# Patient Record
Sex: MALE | Race: WHITE | NOT HISPANIC OR LATINO | Employment: PART TIME | URBAN - NONMETROPOLITAN AREA
[De-identification: names, ages, dates, MRNs, and addresses within clinical notes are randomized per-mention and may not be internally consistent; named-entity substitution may affect disease eponyms.]

---

## 2020-01-27 ENCOUNTER — APPOINTMENT (EMERGENCY)
Dept: RADIOLOGY | Facility: HOSPITAL | Age: 55
End: 2020-01-27
Payer: OTHER GOVERNMENT

## 2020-01-27 ENCOUNTER — HOSPITAL ENCOUNTER (EMERGENCY)
Facility: HOSPITAL | Age: 55
Discharge: HOME | End: 2020-01-27
Attending: EMERGENCY MEDICINE | Admitting: EMERGENCY MEDICINE
Payer: OTHER GOVERNMENT

## 2020-01-27 VITALS
OXYGEN SATURATION: 99 % | RESPIRATION RATE: 18 BRPM | HEIGHT: 68 IN | DIASTOLIC BLOOD PRESSURE: 85 MMHG | TEMPERATURE: 97.1 F | WEIGHT: 220 LBS | SYSTOLIC BLOOD PRESSURE: 133 MMHG | HEART RATE: 78 BPM | BODY MASS INDEX: 33.34 KG/M2

## 2020-01-27 DIAGNOSIS — M25.512 ACUTE PAIN OF LEFT SHOULDER: Primary | ICD-10-CM

## 2020-01-27 LAB
ANION GAP SERPL CALCULATED.3IONS-SCNC: 6 MMOL/L (ref 4–13)
BASOPHILS # BLD AUTO: 0.13 THOUSANDS/ΜL (ref 0–0.1)
BASOPHILS NFR BLD AUTO: 2 % (ref 0–1)
BUN SERPL-MCNC: 14 MG/DL (ref 5–25)
CALCIUM SERPL-MCNC: 8.2 MG/DL (ref 8.3–10.1)
CHLORIDE SERPL-SCNC: 106 MMOL/L (ref 100–108)
CO2 SERPL-SCNC: 30 MMOL/L (ref 21–32)
CREAT SERPL-MCNC: 0.99 MG/DL (ref 0.6–1.3)
EOSINOPHIL # BLD AUTO: 0.71 THOUSAND/ΜL (ref 0–0.61)
EOSINOPHIL NFR BLD AUTO: 9 % (ref 0–6)
ERYTHROCYTE [DISTWIDTH] IN BLOOD BY AUTOMATED COUNT: 12.8 % (ref 11.6–15.1)
GFR SERPL CREATININE-BSD FRML MDRD: 86 ML/MIN/1.73SQ M
GLUCOSE SERPL-MCNC: 141 MG/DL (ref 65–140)
HCT VFR BLD AUTO: 43.3 % (ref 36.5–49.3)
HGB BLD-MCNC: 15.1 G/DL (ref 12–17)
IMM GRANULOCYTES # BLD AUTO: 0.09 THOUSAND/UL (ref 0–0.2)
IMM GRANULOCYTES NFR BLD AUTO: 1 % (ref 0–2)
LYMPHOCYTES # BLD AUTO: 1.9 THOUSANDS/ΜL (ref 0.6–4.47)
LYMPHOCYTES NFR BLD AUTO: 24 % (ref 14–44)
MCH RBC QN AUTO: 32.1 PG (ref 26.8–34.3)
MCHC RBC AUTO-ENTMCNC: 34.9 G/DL (ref 31.4–37.4)
MCV RBC AUTO: 92 FL (ref 82–98)
MONOCYTES # BLD AUTO: 0.64 THOUSAND/ΜL (ref 0.17–1.22)
MONOCYTES NFR BLD AUTO: 8 % (ref 4–12)
NEUTROPHILS # BLD AUTO: 4.37 THOUSANDS/ΜL (ref 1.85–7.62)
NEUTS SEG NFR BLD AUTO: 56 % (ref 43–75)
NRBC BLD AUTO-RTO: 0 /100 WBCS
PLATELET # BLD AUTO: 271 THOUSANDS/UL (ref 149–390)
PMV BLD AUTO: 10.3 FL (ref 8.9–12.7)
POTASSIUM SERPL-SCNC: 4.3 MMOL/L (ref 3.5–5.3)
RBC # BLD AUTO: 4.7 MILLION/UL (ref 3.88–5.62)
SODIUM SERPL-SCNC: 142 MMOL/L (ref 136–145)
TROPONIN I SERPL-MCNC: <0.02 NG/ML
WBC # BLD AUTO: 7.84 THOUSAND/UL (ref 4.31–10.16)

## 2020-01-27 PROCEDURE — 73030 X-RAY EXAM OF SHOULDER: CPT

## 2020-01-27 PROCEDURE — 85025 COMPLETE CBC W/AUTO DIFF WBC: CPT | Performed by: EMERGENCY MEDICINE

## 2020-01-27 PROCEDURE — 99285 EMERGENCY DEPT VISIT HI MDM: CPT | Performed by: PHYSICIAN ASSISTANT

## 2020-01-27 PROCEDURE — 99284 EMERGENCY DEPT VISIT MOD MDM: CPT

## 2020-01-27 PROCEDURE — 71046 X-RAY EXAM CHEST 2 VIEWS: CPT

## 2020-01-27 PROCEDURE — 80048 BASIC METABOLIC PNL TOTAL CA: CPT | Performed by: EMERGENCY MEDICINE

## 2020-01-27 PROCEDURE — 84484 ASSAY OF TROPONIN QUANT: CPT | Performed by: EMERGENCY MEDICINE

## 2020-01-27 PROCEDURE — 93005 ELECTROCARDIOGRAM TRACING: CPT

## 2020-01-27 PROCEDURE — 36415 COLL VENOUS BLD VENIPUNCTURE: CPT | Performed by: EMERGENCY MEDICINE

## 2020-01-27 RX ORDER — ATORVASTATIN CALCIUM 20 MG/1
20 TABLET, FILM COATED ORAL DAILY
COMMUNITY

## 2020-01-27 RX ORDER — MELOXICAM 15 MG/1
15 TABLET ORAL DAILY
COMMUNITY

## 2020-01-27 RX ORDER — DOXEPIN HYDROCHLORIDE 10 MG/1
10 CAPSULE ORAL 3 TIMES DAILY
COMMUNITY

## 2020-01-27 RX ORDER — METHYLPREDNISOLONE 4 MG/1
TABLET ORAL
Qty: 15 TABLET | Refills: 0 | Status: SHIPPED | OUTPATIENT
Start: 2020-01-27 | End: 2020-02-01

## 2020-01-27 RX ORDER — ESCITALOPRAM OXALATE 20 MG/1
20 TABLET ORAL DAILY
COMMUNITY

## 2020-01-27 RX ORDER — SODIUM CHLORIDE 9 MG/ML
3 INJECTION INTRAVENOUS AS NEEDED
Status: DISCONTINUED | OUTPATIENT
Start: 2020-01-27 | End: 2020-01-27 | Stop reason: HOSPADM

## 2020-01-27 RX ORDER — IBUPROFEN 400 MG/1
800 TABLET ORAL ONCE
Status: COMPLETED | OUTPATIENT
Start: 2020-01-27 | End: 2020-01-27

## 2020-01-27 RX ORDER — ASPIRIN 81 MG/1
324 TABLET, CHEWABLE ORAL ONCE
Status: COMPLETED | OUTPATIENT
Start: 2020-01-27 | End: 2020-01-27

## 2020-01-27 RX ORDER — LIDOCAINE 50 MG/G
1 PATCH TOPICAL ONCE
Status: DISCONTINUED | OUTPATIENT
Start: 2020-01-27 | End: 2020-01-27 | Stop reason: HOSPADM

## 2020-01-27 RX ADMIN — ASPIRIN 81 MG 324 MG: 81 TABLET ORAL at 11:30

## 2020-01-27 RX ADMIN — IBUPROFEN 800 MG: 400 TABLET ORAL at 11:12

## 2020-01-27 RX ADMIN — METHYLPREDNISOLONE 24 MG: 16 TABLET ORAL at 12:43

## 2020-01-27 RX ADMIN — LIDOCAINE 1 PATCH: 50 PATCH TOPICAL at 11:12

## 2020-01-27 NOTE — ED PROVIDER NOTES
History  Chief Complaint   Patient presents with    Shoulder Pain     Pt has hx of L sided shoulder pain and gets steroid shots in it, [facility] so came here to see if we can doing something for the pain     47year old male presents to the ED for evaluation of acute on chronic left shoulder pain  Patient states approximately one week ago he was throwing large trash bags into a dumpster when he hurt the left shoulder  Reports history of left shoulder pain concurrently follows with a [place] orthopedic physician  States he had a cortisone injection into the left shoulder approximately 2 years ago  Patient notes he was unable to get an appointment until February feels he cannot wait for another injection  Patient reports having an MRI completed showing decreased joint space in the shoulder  Patient reports decreased range of motion of the left shoulder  He states pain is a "ache" in nature but occasionally sharp with movement  He states pain in located throughout the shoulder and does not radiate  He denies weakness, numbness, paresthesias  Patient denies chest pain, palpitations, shortness of breath, fevers, chills, skin changes  Patient reports using meloxicam for pain control without significant relief  History provided by:  Patient   used: No        Prior to Admission Medications   Prescriptions Last Dose Informant Patient Reported? Taking?   atorvastatin (LIPITOR) 20 mg tablet   Yes Yes   Sig: Take 20 mg by mouth daily   doxepin (SINEquan) 10 mg capsule   Yes Yes   Sig: Take 10 mg by mouth 3 (three) times a day   escitalopram (LEXAPRO) 20 mg tablet   Yes Yes   Sig: Take 20 mg by mouth daily   meloxicam (MOBIC) 15 mg tablet   Yes Yes   Sig: Take 15 mg by mouth daily      Facility-Administered Medications: None       No past medical history on file  No past surgical history on file  No family history on file    I have reviewed and agree with the history as documented  Social History     Tobacco Use    Smoking status: Not on file    Smokeless tobacco: Never Used   Substance Use Topics    Alcohol use: Not Currently    Drug use: Not Currently        Review of Systems   Constitutional: Negative for chills and fever  Cardiovascular: Negative for chest pain and palpitations  Musculoskeletal: Positive for arthralgias  Negative for joint swelling  Decreased left shoulder ROM   Skin: Negative for color change, rash and wound  Neurological: Negative for weakness and numbness  All other systems reviewed and are negative  Physical Exam  Physical Exam   Constitutional: He is oriented to person, place, and time  He appears well-developed and well-nourished  No distress  HENT:   Head: Normocephalic and atraumatic  Eyes: Pupils are equal, round, and reactive to light  Conjunctivae are normal    Cardiovascular: Normal rate and regular rhythm  No murmur heard  Pulmonary/Chest: Effort normal and breath sounds normal  He has no wheezes  He has no rales  He exhibits no tenderness  Musculoskeletal:   Left shoulder diffusely tender  No point tenderness  No obvious deformity  Decreased ROM noted  No swelling, erythema, ecchymosis  Neurovascularly intact  Palpable radial pulses bilaterally   Neurological: He is alert and oriented to person, place, and time  He has normal strength  No sensory deficit  Skin: Skin is warm and dry  Capillary refill takes less than 2 seconds  No rash noted  No erythema  Psychiatric: He has a normal mood and affect  His behavior is normal  Judgment and thought content normal    Nursing note and vitals reviewed        Vital Signs  ED Triage Vitals [01/27/20 1037]   Temperature Pulse Respirations Blood Pressure SpO2   (!) 97 1 °F (36 2 °C) 90 18 133/85 95 %      Temp Source Heart Rate Source Patient Position - Orthostatic VS BP Location FiO2 (%)   Temporal Monitor Lying Right arm --      Pain Score       7 Vitals:    01/27/20 1037 01/27/20 1245 01/27/20 1248   BP: 133/85     Pulse: 90 80 78   Patient Position - Orthostatic VS: Lying           Visual Acuity      ED Medications  Medications   lidocaine (LIDODERM) 5 % patch 1 patch (1 patch Topical Medication Applied 1/27/20 1112)   sodium chloride (PF) 0 9 % injection 3 mL (has no administration in time range)   ibuprofen (MOTRIN) tablet 800 mg (800 mg Oral Given 1/27/20 1112)   aspirin chewable tablet 324 mg (324 mg Oral Given 1/27/20 1130)   methylprednisolone (MEDROL) tablet 24 mg (24 mg Oral Given 1/27/20 1243)       Diagnostic Studies  Results Reviewed     Procedure Component Value Units Date/Time    Troponin I [[id number]]  (Normal) Collected:  01/27/20 1145    Lab Status:  Final result Specimen:  Blood from Arm, Left Updated:  01/27/20 1209     Troponin I <0 02 ng/mL     Basic metabolic panel [[id number]]  (Abnormal) Collected:  01/27/20 1145    Lab Status:  Final result Specimen:  Blood from Arm, Left Updated:  01/27/20 1159     Sodium 142 mmol/L      Potassium 4 3 mmol/L      Chloride 106 mmol/L      CO2 30 mmol/L      ANION GAP 6 mmol/L      BUN 14 mg/dL      Creatinine 0 99 mg/dL      Glucose 141 mg/dL      Calcium 8 2 mg/dL      eGFR 86 ml/min/1 73sq m     Narrative:       Milena guidelines for Chronic Kidney Disease (CKD):     Stage 1 with normal or high GFR (GFR > 90 mL/min/1 73 square meters)    Stage 2 Mild CKD (GFR = 60-89 mL/min/1 73 square meters)    Stage 3A Moderate CKD (GFR = 45-59 mL/min/1 73 square meters)    Stage 3B Moderate CKD (GFR = 30-44 mL/min/1 73 square meters)    Stage 4 Severe CKD (GFR = 15-29 mL/min/1 73 square meters)    Stage 5 End Stage CKD (GFR <15 mL/min/1 73 square meters)  Note: GFR calculation is accurate only with a steady state creatinine    CBC and differential [[id number]]  (Abnormal) Collected:  01/27/20 1145    Lab Status:  Final result Specimen:  Blood from Arm, Left Updated: 01/27/20 1151     WBC 7 84 Thousand/uL      RBC 4 70 Million/uL      Hemoglobin 15 1 g/dL      Hematocrit 43 3 %      MCV 92 fL      MCH 32 1 pg      MCHC 34 9 g/dL      RDW 12 8 %      MPV 10 3 fL      Platelets 117 Thousands/uL      nRBC 0 /100 WBCs      Neutrophils Relative 56 %      Immat GRANS % 1 %      Lymphocytes Relative 24 %      Monocytes Relative 8 %      Eosinophils Relative 9 %      Basophils Relative 2 %      Neutrophils Absolute 4 37 Thousands/µL      Immature Grans Absolute 0 09 Thousand/uL      Lymphocytes Absolute 1 90 Thousands/µL      Monocytes Absolute 0 64 Thousand/µL      Eosinophils Absolute 0 71 Thousand/µL      Basophils Absolute 0 13 Thousands/µL                  X-ray chest 2 views   Final Result by Flako Shetty MD (01/27 1250)      No acute cardiopulmonary disease  Workstation performed: [id number]         XR shoulder 2+ views LEFT   Final Result by Flako Shetty MD (01/27 1246)      No acute osseous abnormality        AC joint hypertrophy               Workstation performed: [id number]                    Procedures  ECG 12 Lead Documentation Only  Date/Time: 1/27/2020 11:25 AM  Performed by: Tanmay Palu PA-C  Authorized by: Tanmay Paul PA-C     Indications / Diagnosis:  Left shoulder pain  ECG reviewed by me, the ED Provider: yes    Patient location:  ED  Previous ECG:     Previous ECG:  Unavailable  Interpretation:     Interpretation: normal    Rate:     ECG rate:  78bpm    ECG rate assessment: normal    Rhythm:     Rhythm: sinus rhythm    QRS:     QRS axis:  Normal    QRS intervals:  Normal  ST segments:     ST segments:  Normal  T waves:     T waves: normal               ED Course  ED Course as of Jan 27 1359   Mon Jan 27, 2020   1119 No acute fracture or dislocation noted on shoulder xray      1128 EKG: NSR, vent rate 78 bpm, no acute ischemic changes      1218 X-ray chest 2 views   1219 X-ray chest 2 views   1223 Chest Xray: No acute cardiopulmonary process noted         Vitals and medical record reviewed  Nursing notes reviewed  Acute on chronic shoulder left sided shoulder pain, onset s/p throwing trash bags into dumpster 1 week ago  My personal interpretation of EKG: NSR, nonischemic  HEART score: 2  Lab significant for negative troponin  My personal interpretation of shoulder xray: no acute osseous findings  My personal interpretation of chest x-ray: no acute cardiopulmonary findings  Patient was treated with ibuprofen, lidocaine patch, 1st dose of Medrol Dosepak  Remainder of Medrol Dosepak prescription provided  Patient was educated on importance of follow-up with Cardiology and orthopedic  Patient refuses any referrals stating he will follow up with the [facility] on both accounts  Patient was educated on symptoms that require prompt return to the ED for further evaluation verbalized understanding  Patient agreed to treatment plan, he remained well ED and was discharged home        MDM  Number of Diagnoses or Management Options  Acute pain of left shoulder: new and requires workup     Amount and/or Complexity of Data Reviewed  Clinical lab tests: ordered and reviewed  Tests in the radiology section of CPT®: ordered and reviewed  Review and summarize past medical records: yes  Independent visualization of images, tracings, or specimens: yes    Patient Progress  Patient progress: stable        Disposition  Final diagnoses:   Acute pain of left shoulder     Time reflects when diagnosis was documented in both MDM as applicable and the Disposition within this note     Time User Action Codes Description Comment    1/27/2020 12:24 PM Cecil Garvey Add [[id number]] Acute pain of left shoulder       ED Disposition     ED Disposition Condition Date/Time Comment    Discharge Stable Mon Jan 27, 2020 12:24 PM Dahiana Sexton discharge to home/self care              Follow-up Information     Follow up With Specialties Details Why Contact Saurabh Lawler DO Cardiology In 3 days If you are unable to be seen by the [facility], please follow up with Dr Stefany Torres within 67 hours Milan Riggins [phone]  [phone]            Discharge Medication List as of 1/27/2020 12:35 PM      START taking these medications    Details   methylPREDNISolone 4 MG tablet therapy pack Multiple Dosages:Starting Mon 1/27/2020, Last dose on Mon 1/27/2020, THEN Starting Tue 1/28/2020, Last dose on Tue 1/28/2020, THEN Starting Wed 1/29/2020, Last dose on Wed 1/29/2020, THEN Starting Thu 1/30/2020, Last dose on Thu 1/30/2020, THEN Star ting Fri 1/31/2020, Last dose on Fri 1/31/2020Take 5 tablets (20 mg total) by mouth daily for 1 day, THEN 4 tablets (16 mg total) daily for 1 day, THEN 3 tablets (12 mg total) daily for 1 day, THEN 2 tablets (8 mg total) daily for 1 day, THEN 1 tablet ( 4 mg total) daily for 1 day , Print         CONTINUE these medications which have NOT CHANGED    Details   atorvastatin (LIPITOR) 20 mg tablet Take 20 mg by mouth daily, Historical Med      doxepin (SINEquan) 10 mg capsule Take 10 mg by mouth 3 (three) times a day, Historical Med      escitalopram (LEXAPRO) 20 mg tablet Take 20 mg by mouth daily, Historical Med      meloxicam (MOBIC) 15 mg tablet Take 15 mg by mouth daily, Historical Med           No discharge procedures on file      ED Provider  Electronically Signed by           Andriy Hyde PA-C  01/27/20 0887

## 2020-01-27 NOTE — ED ATTENDING ATTESTATION
1/27/2020  Danielle FERRELL MD, saw and evaluated the patient  I have discussed the patient with the resident/non-physician practitioner and agree with the resident's/non-physician practitioner's findings, Plan of Care, and MDM as documented in the resident's/non-physician practitioner's note, except where noted  All available labs and Radiology studies were reviewed  I was present for key portions of any procedure(s) performed by the resident/non-physician practitioner and I was immediately available to provide assistance  At this point I agree with the current assessment done in the Emergency Department  I have conducted an independent evaluation of this patient a history and physical is as follows:    59-year-old male presents with chronic left-sided shoulder pain which was exacerbated by recent physical activity  Patient usually gets steroid shots from his [address] but he was unable to get an appointment until mid February 2020  Upon exam patient has point tenderness of the acromioclavicular joint and supraspinatus tendon tenderness; there is no deformity, crepitus, mass, erythema or bruising  Mild ROM with abduction  Patient does admit that pain radiates to left upper chest at times and denies history of myocardial infarction, stents or other cardiac problems  Patient is on Lipitor for hyperlipidemia and has not seen a cardiologist   Patient's chief complaint is likely due to musculoskeletal etiology, imaging is negative for dislocation or fracture  Patient admits to having chronic degenerative changes on recent MRI shoulder and states he will follow-up with his [place] doctor after discharge  Cardiac panel is negative with HEART score 2  [facility]able [facility] with [facility]ology referral within the next 72 hours  No reproducible chest wall tenderness  Vitals stable  Patient is AAAOx4         ED Course  ED Course as of Jan 27 1700   Mon Jan 27, 2020   1206 Chest x-ray read and interpreted by me  Negative for pneumothorax, mediastinal widening, focal consolidation or pleural effusions      1208 Left shoulder x-ray read interpreted by me  No acute osseous abnormalities    No dislocation or fracture            Critical Care Time  Procedures

## 2020-01-28 LAB
ATRIAL RATE: 78 BPM
P AXIS: 52 DEGREES
PR INTERVAL: 168 MS
QRS AXIS: 54 DEGREES
QRSD INTERVAL: 84 MS
QT INTERVAL: 356 MS
QTC INTERVAL: 405 MS
T WAVE AXIS: 49 DEGREES
VENTRICULAR RATE: 78 BPM

## 2021-03-24 ENCOUNTER — APPOINTMENT (EMERGENCY)
Dept: RADIOLOGY | Facility: HOSPITAL | Age: 56
End: 2021-03-24
Payer: OTHER GOVERNMENT

## 2021-03-24 ENCOUNTER — HOSPITAL ENCOUNTER (EMERGENCY)
Facility: HOSPITAL | Age: 56
Discharge: HOME | End: 2021-03-24
Attending: EMERGENCY MEDICINE | Admitting: EMERGENCY MEDICINE
Payer: OTHER GOVERNMENT

## 2021-03-24 VITALS
OXYGEN SATURATION: 98 % | BODY MASS INDEX: 32.54 KG/M2 | TEMPERATURE: 98.5 F | HEIGHT: 68 IN | WEIGHT: 214.73 LBS | DIASTOLIC BLOOD PRESSURE: 75 MMHG | SYSTOLIC BLOOD PRESSURE: 119 MMHG | HEART RATE: 90 BPM | RESPIRATION RATE: 20 BRPM

## 2021-03-24 DIAGNOSIS — J01.10 ACUTE NON-RECURRENT FRONTAL SINUSITIS: Primary | ICD-10-CM

## 2021-03-24 DIAGNOSIS — J40 BRONCHITIS: ICD-10-CM

## 2021-03-24 LAB
ALBUMIN SERPL BCP-MCNC: 3.5 G/DL (ref 3.5–5)
ALP SERPL-CCNC: 79 U/L (ref 46–116)
ALT SERPL W P-5'-P-CCNC: 38 U/L (ref 12–78)
ANION GAP SERPL CALCULATED.3IONS-SCNC: 5 MMOL/L (ref 4–13)
AST SERPL W P-5'-P-CCNC: 20 U/L (ref 5–45)
BASOPHILS # BLD AUTO: 0.09 THOUSANDS/ΜL (ref 0–0.1)
BASOPHILS NFR BLD AUTO: 1 % (ref 0–1)
BILIRUB SERPL-MCNC: 0.68 MG/DL (ref 0.2–1)
BUN SERPL-MCNC: 17 MG/DL (ref 5–25)
CALCIUM SERPL-MCNC: 8.5 MG/DL (ref 8.3–10.1)
CHLORIDE SERPL-SCNC: 104 MMOL/L (ref 100–108)
CO2 SERPL-SCNC: 30 MMOL/L (ref 21–32)
CREAT SERPL-MCNC: 1.14 MG/DL (ref 0.6–1.3)
D DIMER PPP FEU-MCNC: 0.3 UG/ML FEU
EOSINOPHIL # BLD AUTO: 0.45 THOUSAND/ΜL (ref 0–0.61)
EOSINOPHIL NFR BLD AUTO: 6 % (ref 0–6)
ERYTHROCYTE [DISTWIDTH] IN BLOOD BY AUTOMATED COUNT: 13 % (ref 11.6–15.1)
FLUAV RNA RESP QL NAA+PROBE: NEGATIVE
FLUBV RNA RESP QL NAA+PROBE: NEGATIVE
GFR SERPL CREATININE-BSD FRML MDRD: 72 ML/MIN/1.73SQ M
GLUCOSE SERPL-MCNC: 142 MG/DL (ref 65–140)
HCT VFR BLD AUTO: 43.7 % (ref 36.5–49.3)
HGB BLD-MCNC: 14.8 G/DL (ref 12–17)
IMM GRANULOCYTES # BLD AUTO: 0.08 THOUSAND/UL (ref 0–0.2)
IMM GRANULOCYTES NFR BLD AUTO: 1 % (ref 0–2)
LACTATE SERPL-SCNC: 0.7 MMOL/L (ref 0.5–2)
LIPASE SERPL-CCNC: 155 U/L (ref 73–393)
LYMPHOCYTES # BLD AUTO: 2.3 THOUSANDS/ΜL (ref 0.6–4.47)
LYMPHOCYTES NFR BLD AUTO: 29 % (ref 14–44)
MCH RBC QN AUTO: 31.8 PG (ref 26.8–34.3)
MCHC RBC AUTO-ENTMCNC: 33.9 G/DL (ref 31.4–37.4)
MCV RBC AUTO: 94 FL (ref 82–98)
MONOCYTES # BLD AUTO: 0.72 THOUSAND/ΜL (ref 0.17–1.22)
MONOCYTES NFR BLD AUTO: 9 % (ref 4–12)
NEUTROPHILS # BLD AUTO: 4.4 THOUSANDS/ΜL (ref 1.85–7.62)
NEUTS SEG NFR BLD AUTO: 54 % (ref 43–75)
NRBC BLD AUTO-RTO: 0 /100 WBCS
NT-PROBNP SERPL-MCNC: 16 PG/ML
PLATELET # BLD AUTO: 353 THOUSANDS/UL (ref 149–390)
PMV BLD AUTO: 10.2 FL (ref 8.9–12.7)
POTASSIUM SERPL-SCNC: 4 MMOL/L (ref 3.5–5.3)
PROT SERPL-MCNC: 7.1 G/DL (ref 6.4–8.2)
RBC # BLD AUTO: 4.65 MILLION/UL (ref 3.88–5.62)
RSV RNA RESP QL NAA+PROBE: NEGATIVE
SARS-COV-2 RNA RESP QL NAA+PROBE: NEGATIVE
SODIUM SERPL-SCNC: 139 MMOL/L (ref 136–145)
TROPONIN I SERPL-MCNC: <0.02 NG/ML
WBC # BLD AUTO: 8.04 THOUSAND/UL (ref 4.31–10.16)

## 2021-03-24 PROCEDURE — 85379 FIBRIN DEGRADATION QUANT: CPT | Performed by: EMERGENCY MEDICINE

## 2021-03-24 PROCEDURE — 99284 EMERGENCY DEPT VISIT MOD MDM: CPT

## 2021-03-24 PROCEDURE — 80053 COMPREHEN METABOLIC PANEL: CPT | Performed by: EMERGENCY MEDICINE

## 2021-03-24 PROCEDURE — 0241U HB NFCT DS VIR RESP RNA 4 TRGT: CPT | Performed by: EMERGENCY MEDICINE

## 2021-03-24 PROCEDURE — 83690 ASSAY OF LIPASE: CPT | Performed by: EMERGENCY MEDICINE

## 2021-03-24 PROCEDURE — 83605 ASSAY OF LACTIC ACID: CPT | Performed by: EMERGENCY MEDICINE

## 2021-03-24 PROCEDURE — 84484 ASSAY OF TROPONIN QUANT: CPT | Performed by: EMERGENCY MEDICINE

## 2021-03-24 PROCEDURE — 71045 X-RAY EXAM CHEST 1 VIEW: CPT

## 2021-03-24 PROCEDURE — 99285 EMERGENCY DEPT VISIT HI MDM: CPT | Performed by: EMERGENCY MEDICINE

## 2021-03-24 PROCEDURE — 83880 ASSAY OF NATRIURETIC PEPTIDE: CPT | Performed by: EMERGENCY MEDICINE

## 2021-03-24 PROCEDURE — 36415 COLL VENOUS BLD VENIPUNCTURE: CPT | Performed by: EMERGENCY MEDICINE

## 2021-03-24 PROCEDURE — 93005 ELECTROCARDIOGRAM TRACING: CPT

## 2021-03-24 PROCEDURE — 85025 COMPLETE CBC W/AUTO DIFF WBC: CPT | Performed by: EMERGENCY MEDICINE

## 2021-03-24 RX ORDER — DOXYCYCLINE HYCLATE 100 MG/1
100 CAPSULE ORAL ONCE
Status: COMPLETED | OUTPATIENT
Start: 2021-03-24 | End: 2021-03-24

## 2021-03-24 RX ORDER — DOXYCYCLINE HYCLATE 100 MG/1
100 CAPSULE ORAL 2 TIMES DAILY
Qty: 20 CAPSULE | Refills: 0 | Status: SHIPPED | OUTPATIENT
Start: 2021-03-24 | End: 2021-04-03

## 2021-03-24 RX ORDER — ACETAMINOPHEN 325 MG/1
650 TABLET ORAL ONCE
Status: COMPLETED | OUTPATIENT
Start: 2021-03-24 | End: 2021-03-24

## 2021-03-24 RX ADMIN — ACETAMINOPHEN 650 MG: 325 TABLET ORAL at 21:04

## 2021-03-24 RX ADMIN — DOXYCYCLINE 100 MG: 100 CAPSULE ORAL at 21:34

## 2021-03-25 LAB
ATRIAL RATE: 75 BPM
P AXIS: 62 DEGREES
PR INTERVAL: 174 MS
QRS AXIS: 77 DEGREES
QRSD INTERVAL: 88 MS
QT INTERVAL: 370 MS
QTC INTERVAL: 413 MS
T WAVE AXIS: 61 DEGREES
VENTRICULAR RATE: 75 BPM

## 2021-03-25 NOTE — ED PROVIDER NOTES
History  Chief Complaint   Patient presents with    Cold Like Symptoms     Patient reports cold symptoms since last week  Reports he has been experiencing pressure in chest since Monday  Patient been sick with cold symptoms for the past 1 week  Congested  Runny nose sore throat  Postnasal drip  Has a cough with whitish phlegm production  Does not smoke  No history of asthma or COPD  Complains of chest tightness since yesterday  The tenderness has been constant  Has not gone away since yesterday  No history of heart disease  No history of hypertension or diabetes  Does not smoke  No recent travel  [facility] contacts  No history of COVID  History provided by:  Patient   used: No    Cough  Cough characteristics:  Productive  Sputum characteristics:  Nondescript  Severity:  Mild  Onset quality:  Gradual  Duration:  1 week  Timing:  Constant  Progression:  Unchanged  Chronicity:  New  Smoker: no    Context: upper respiratory infection    Context: not exposure to allergens and not sick contacts    Relieved by:  Nothing  Worsened by:  Nothing  Ineffective treatments:  None tried  Associated symptoms: chest pain, headaches, rhinorrhea, sinus congestion and sore throat    Associated symptoms: no chills, no diaphoresis, no ear pain, no eye discharge, no fever, no rash, no shortness of breath and no wheezing        Prior to Admission Medications   Prescriptions Last Dose Informant Patient Reported?  Taking?   atorvastatin (LIPITOR) 20 mg tablet 3/23/2021 at Unknown time  Yes Yes   Sig: Take 20 mg by mouth daily   doxepin (SINEquan) 10 mg capsule 3/23/2021 at Unknown time  Yes Yes   Sig: Take 10 mg by mouth 3 (three) times a day   escitalopram (LEXAPRO) 20 mg tablet 3/23/2021 at Unknown time  Yes Yes   Sig: Take 20 mg by mouth daily   meloxicam (MOBIC) 15 mg tablet 3/23/2021 at Unknown time  Yes Yes   Sig: Take 15 mg by mouth daily      Facility-Administered Medications: None History reviewed  No pertinent past medical history  Past Surgical History:   Procedure Laterality Date    HERNIA REPAIR         History reviewed  No pertinent family history  I have reviewed and agree with the history as documented  E-Cigarette/Vaping    E-Cigarette Use Never User      E-Cigarette/Vaping Substances     Social History     Tobacco Use    Smoking status: Never Smoker    Smokeless tobacco: Never Used   Substance Use Topics    Alcohol use: Not Currently    Drug use: Not Currently       Review of Systems   Constitutional: Positive for appetite change  Negative for chills, diaphoresis and fever  HENT: Positive for rhinorrhea and sore throat  Negative for ear pain, hearing loss, trouble swallowing and voice change  Eyes: Negative for pain and discharge  Respiratory: Positive for cough  Negative for shortness of breath and wheezing  Cardiovascular: Positive for chest pain  Negative for palpitations  Gastrointestinal: Negative for abdominal pain, blood in stool, constipation, diarrhea, nausea and vomiting  Genitourinary: Negative for dysuria, flank pain, frequency and hematuria  Musculoskeletal: Negative for joint swelling, neck pain and neck stiffness  Skin: Negative for rash and wound  Neurological: Positive for headaches  Negative for dizziness, seizures, syncope and facial asymmetry  Psychiatric/Behavioral: Negative for hallucinations, self-injury and suicidal ideas  All other systems reviewed and are negative  Physical Exam  Physical Exam  Vitals signs and nursing note reviewed  Constitutional:       General: He is not in acute distress  Appearance: He is well-developed  HENT:      Head: Normocephalic and atraumatic  Right Ear: External ear normal       Left Ear: External ear normal    Eyes:      Conjunctiva/sclera: Conjunctivae normal       Pupils: Pupils are equal, round, and reactive to light     Neck:      Musculoskeletal: Normal range of motion and neck supple  Cardiovascular:      Rate and Rhythm: Normal rate and regular rhythm  Heart sounds: Normal heart sounds  No murmur  Pulmonary:      Effort: Pulmonary effort is normal       Breath sounds: Normal breath sounds  No wheezing or rales  Abdominal:      General: Bowel sounds are normal  There is no distension  Palpations: Abdomen is soft  Tenderness: There is no abdominal tenderness  There is no guarding or rebound  Musculoskeletal: Normal range of motion  General: No deformity  Skin:     General: Skin is warm and dry  Findings: No rash  Neurological:      General: No focal deficit present  Mental Status: He is alert and oriented to person, place, and time  Cranial Nerves: No cranial nerve deficit  Psychiatric:         Behavior: Behavior normal          Vital Signs  ED Triage Vitals [03/24/21 2002]   Temperature Pulse Respirations Blood Pressure SpO2   (!) 97 °F (36 1 °C) 74 20 144/83 98 %      Temp Source Heart Rate Source Patient Position - Orthostatic VS BP Location FiO2 (%)   Temporal Monitor Lying Right arm --      Pain Score       2           Vitals:    03/24/21 2002 03/24/21 2123   BP: 144/83 141/68   Pulse: 74 79   Patient Position - Orthostatic VS: Lying Lying         Visual Acuity      ED Medications  Medications   acetaminophen (TYLENOL) tablet 650 mg (650 mg Oral Incomplete 3/24/21 2104)   doxycycline hyclate (VIBRAMYCIN) capsule 100 mg (has no administration in time range)       Diagnostic Studies  Results Reviewed     Procedure Component Value Units Date/Time    COVID19, Influenza A/B, RSV PCR, [facility] [[id number]]  (Normal) Collected: 03/24/21 2029    Lab Status: Final result Specimen: Nares from Nasopharyngeal Swab Updated: 03/24/21 2120     SARS-CoV-2 Negative     INFLUENZA A PCR Negative     INFLUENZA B PCR Negative     RSV PCR Negative    Narrative:        This test has been authorized by FDA under an EUA (Emergency Use Assay) for use by authorized laboratories  Clinical caution and judgement should be used with the interpretation of these results with consideration of the clinical impression and other laboratory testing  Testing reported as "Positive" or "Negative" has been proven to be accurate according to standard laboratory validation requirements  All testing is performed with control materials showing appropriate reactivity at standard intervals  NT-BNP PRO [[id number]]  (Normal) Collected: 03/24/21 2029    Lab Status: Final result Specimen: Blood from Arm, Left Updated: 03/24/21 2100     NT-proBNP 16 pg/mL     Lipase [[id number]]  (Normal) Collected: 03/24/21 2029    Lab Status: Final result Specimen: Blood from Arm, Left Updated: 03/24/21 2100     Lipase 155 u/L     Lactic acid [[id number]]  (Normal) Collected: 03/24/21 2029    Lab Status: Final result Specimen: Blood from Arm, Left Updated: 03/24/21 2058     LACTIC ACID 0 7 mmol/L     Narrative:      Result may be elevated if tourniquet was used during collection      Troponin I [[id number]]  (Normal) Collected: 03/24/21 2029    Lab Status: Final result Specimen: Blood from Arm, Left Updated: 03/24/21 2057     Troponin I <0 02 ng/mL     Comprehensive metabolic panel [[id number]]  (Abnormal) Collected: 03/24/21 2029    Lab Status: Final result Specimen: Blood from Arm, Left Updated: 03/24/21 2056     Sodium 139 mmol/L      Potassium 4 0 mmol/L      Chloride 104 mmol/L      CO2 30 mmol/L      ANION GAP 5 mmol/L      BUN 17 mg/dL      Creatinine 1 14 mg/dL      Glucose 142 mg/dL      Calcium 8 5 mg/dL      AST 20 U/L      ALT 38 U/L      Alkaline Phosphatase 79 U/L      Total Protein 7 1 g/dL      Albumin 3 5 g/dL      Total Bilirubin 0 68 mg/dL      eGFR 72 ml/min/1 73sq m     Narrative:      [place] guidelines for Chronic Kidney Disease (CKD):     Stage 1 with normal or high GFR (GFR > 90 mL/min/1 73 square meters)    Stage 2 Mild CKD (GFR = 60-89 mL/min/1 73 square meters)    Stage 3A Moderate CKD (GFR = 45-59 mL/min/1 73 square meters)    Stage 3B Moderate CKD (GFR = 30-44 mL/min/1 73 square meters)    Stage 4 Severe CKD (GFR = 15-29 mL/min/1 73 square meters)    Stage 5 End Stage CKD (GFR <15 mL/min/1 73 square meters)  Note: GFR calculation is accurate only with a steady state creatinine    D-Dimer [[id number]]  (Normal) Collected: 03/24/21 2029    Lab Status: Final result Specimen: Blood from Arm, Left Updated: 03/24/21 2050     D-Dimer, Quant 0 30 ug/ml FEU     CBC and differential [[id number]] Collected: 03/24/21 2029    Lab Status: Final result Specimen: Blood from Arm, Left Updated: 03/24/21 2036     WBC 8 04 Thousand/uL      RBC 4 65 Million/uL      Hemoglobin 14 8 g/dL      Hematocrit 43 7 %      MCV 94 fL      MCH 31 8 pg      MCHC 33 9 g/dL      RDW 13 0 %      MPV 10 2 fL      Platelets 433 Thousands/uL      nRBC 0 /100 WBCs      Neutrophils Relative 54 %      Immat GRANS % 1 %      Lymphocytes Relative 29 %      Monocytes Relative 9 %      Eosinophils Relative 6 %      Basophils Relative 1 %      Neutrophils Absolute 4 40 Thousands/µL      Immature Grans Absolute 0 08 Thousand/uL      Lymphocytes Absolute 2 30 Thousands/µL      Monocytes Absolute 0 72 Thousand/µL      Eosinophils Absolute 0 45 Thousand/µL      Basophils Absolute 0 09 Thousands/µL                  XR chest 1 view portable   ED Interpretation by Krysta Johnson MD (03/24 2046)   NAD                 Procedures  ECG 12 Lead Documentation Only    Date/Time: 3/24/2021 8:11 PM  Performed by: Krysta Johnson MD  Authorized by: Krysta Johnson MD     ECG reviewed by me, the ED Provider: yes    Patient location:  ED  Previous ECG:     Previous ECG:  Compared to current    Similarity:  No change  Rate:     ECG rate:  70  Rhythm:     Rhythm: sinus rhythm    Ectopy:     Ectopy: none    QRS:     QRS axis:  Normal             ED Course             HEART Risk Score      Most Recent Value   Heart Score Risk Calculator   History  0 Filed at: 03/24/2021 2059   ECG  0 Filed at: 03/24/2021 2059   Age  1 Filed at: 03/24/2021 2059   Risk Factors  1 Filed at: 03/24/2021 2059   Troponin  0 Filed at: 03/24/2021 2059   HEART Score  2 Filed at: 03/24/2021 2059                                    MDM  Number of Diagnoses or Management Options  Diagnosis management comments: The patient's constant chest tightness since yesterday  Patient is obese  Has high cholesterol  EKG is unremarkable 1st troponin is negative  Given that the symptoms have been constant for the past 24 hours I do not think is cardiac in origin with a normal troponin  Amount and/or Complexity of Data Reviewed  Clinical lab tests: reviewed  Review and summarize past medical records: yes        Disposition  Final diagnoses:   Acute non-recurrent frontal sinusitis   Bronchitis     Time reflects when diagnosis was documented in both MDM as applicable and the Disposition within this note     Time User Action Codes Description Comment    3/24/2021  9:25 PM Cele Rodriguez Add [J01 10] Acute non-recurrent frontal sinusitis     3/24/2021  9:25 PM Carmelo Butts Add [J40] Bronchitis       ED Disposition     ED Disposition Condition Date/Time Comment    Discharge Stable Wed Mar 24, 2021  9:25 PM Sasha Whitman discharge to home/self care  Follow-up Information    None         Patient's Medications   Discharge Prescriptions    DOXYCYCLINE HYCLATE (VIBRAMYCIN) 100 MG CAPSULE    Take 1 capsule (100 mg total) by mouth 2 (two) times a day for 10 days       Start Date: 3/24/2021 End Date: 4/3/2021       Order Dose: 100 mg       Quantity: 20 capsule    Refills: 0     No discharge procedures on file      PDMP Review     None          ED Provider  Electronically Signed by           Vivian Beal MD  03/24/21 2485

## 2021-03-30 DIAGNOSIS — Z23 ENCOUNTER FOR IMMUNIZATION: ICD-10-CM
